# Patient Record
Sex: FEMALE | Race: WHITE | NOT HISPANIC OR LATINO
[De-identification: names, ages, dates, MRNs, and addresses within clinical notes are randomized per-mention and may not be internally consistent; named-entity substitution may affect disease eponyms.]

---

## 2023-05-12 PROBLEM — Z00.00 ENCOUNTER FOR PREVENTIVE HEALTH EXAMINATION: Status: ACTIVE | Noted: 2023-05-12

## 2023-05-16 ENCOUNTER — APPOINTMENT (OUTPATIENT)
Dept: ORTHOPEDIC SURGERY | Facility: CLINIC | Age: 73
End: 2023-05-16
Payer: MEDICARE

## 2023-05-16 DIAGNOSIS — Z87.891 PERSONAL HISTORY OF NICOTINE DEPENDENCE: ICD-10-CM

## 2023-05-16 DIAGNOSIS — Z63.5 DISRUPTION OF FAMILY BY SEPARATION AND DIVORCE: ICD-10-CM

## 2023-05-16 DIAGNOSIS — Z86.39 PERSONAL HISTORY OF OTHER ENDOCRINE, NUTRITIONAL AND METABOLIC DISEASE: ICD-10-CM

## 2023-05-16 DIAGNOSIS — Z78.9 OTHER SPECIFIED HEALTH STATUS: ICD-10-CM

## 2023-05-16 PROCEDURE — 99203 OFFICE O/P NEW LOW 30 MIN: CPT

## 2023-05-16 SDOH — SOCIAL STABILITY - SOCIAL INSECURITY: DISRUPTION OF FAMILY BY SEPARATION AND DIVORCE: Z63.5

## 2023-05-16 NOTE — HISTORY OF PRESENT ILLNESS
[Sudden] : sudden [7] : 7 [4] : 4 [Dull/Aching] : dull/aching [Throbbing] : throbbing [Intermittent] : intermittent [Household chores] : household chores [Leisure] : leisure [Social interactions] : social interactions [Rest] : rest [Retired] : Work status: retired [de-identified] : Patient is here for her right ankle. Patient states she walked into a low branch of a tree and fell on 5/11/2023. Patient went to [facility] and had an x-ray and a post-op shoe put on. Patient states she gets a dull aching pain with weight bearing. Patient states the pain is on the lateral aspect of her ankle and the arch of her foot.  [] : Post Surgical Visit: no [FreeTextEntry1] : Right ankle  [FreeTextEntry3] : 5/11/2023 [FreeTextEntry5] : Patient states she walked into a low branch of a tree and fell  [de-identified] : Movement

## 2023-05-16 NOTE — ASSESSMENT
[FreeTextEntry1] : 74 yo female presenting with right anterior talar avulsion, ATFL ankle sprain, foot sprain. Recommend Non-Surgical management\par -RLE WBAT, may ween off and d/c post-op shoe once tolerable\par -Activities as tolerated, avoid strenuous/impact related activities\par -Rest, ice, compression, elevation, NSAIDs PRN for pain. \par -All questions answered\par -F/u 6 weeks\par \par Entered by Ko Garrido PA-C acting as scribe.\par Dr. Brown Attestation\par The documentation recorded by the scribe, in my presence, accurately reflects the service I, Dr. Brown, personally performed, and the decisions made by me with my edits as appropriate.\par \par

## 2023-05-16 NOTE — PHYSICAL EXAM
[de-identified] : Examination of the right foot and ankle is as follows:\par INSPECTION: swelling, ecchymosis over anterior ankle joint line and dorsal foot, moderate swelling of dorsal foot and lateral ankle, but no abrasion, laceration, no erythema\par PALPATION: lateral ligament tenderness, tenderness to palpation over anterior ankle joint line\par ROM: plantarflexion 20 degrees, inversion 15 degrees, eversion 10 degrees, dorsiflexion 10 degrees\par STRENGTH: dorsiflexion 4/5, plantar flexion 4/5, inversion 4/5, eversion 4/5, EHL 5/5, FHL 5/5\par VASCULAR: dorsalis pedis pulse: 1+, posterior tibialis pulse: 1+\par NEURO: Sensation present to light touch in all distributions\par GAIT: mildly antalgic, RLE post-op shoe, ambulation without assisted devices\par \par X-rays of the right ankle is as follows: outside x-rays reviewed from [facility]\par Ankle 3 view: Avulsion over anterior talus otherwise there are no subluxations or dislocations. No significant abnormalities are seen.

## 2023-06-27 ENCOUNTER — APPOINTMENT (OUTPATIENT)
Dept: ORTHOPEDIC SURGERY | Facility: CLINIC | Age: 73
End: 2023-06-27
Payer: MEDICARE

## 2023-06-27 DIAGNOSIS — S93.491A SPRAIN OF OTHER LIGAMENT OF RIGHT ANKLE, INITIAL ENCOUNTER: ICD-10-CM

## 2023-06-27 DIAGNOSIS — S93.601A UNSPECIFIED SPRAIN OF RIGHT FOOT, INITIAL ENCOUNTER: ICD-10-CM

## 2023-06-27 DIAGNOSIS — S92.154A NONDISPLACED AVULSION FRACTURE (CHIP FRACTURE) OF RIGHT TALUS, INITIAL ENCOUNTER FOR CLOSED FRACTURE: ICD-10-CM

## 2023-06-27 PROCEDURE — 99213 OFFICE O/P EST LOW 20 MIN: CPT

## 2023-06-27 NOTE — PHYSICAL EXAM
[de-identified] : Examination of the right foot and ankle is as follows:\par INSPECTION: swelling, ecchymosis over anterior ankle joint line and dorsal foot, moderate swelling of dorsal foot and lateral ankle, but no abrasion, laceration, no erythema\par PALPATION: lateral ligament tenderness, tenderness to palpation over anterior ankle joint line\par ROM: plantarflexion 20 degrees, inversion 15 degrees, eversion 10 degrees, dorsiflexion 10 degrees\par STRENGTH: dorsiflexion 4/5, plantar flexion 4/5, inversion 4/5, eversion 4/5, EHL 5/5, FHL 5/5\par VASCULAR: dorsalis pedis pulse: 1+, posterior tibialis pulse: 1+\par NEURO: Sensation present to light touch in all distributions\par GAIT: mildly antalgic, RLE post-op shoe, ambulation without assisted devices\par \par X-rays of the right ankle is as follows: outside x-rays reviewed from [facility]\par Ankle 3 view: Avulsion over anterior talus otherwise there are no subluxations or dislocations. No significant abnormalities are seen.

## 2023-06-27 NOTE — HISTORY OF PRESENT ILLNESS
[Sudden] : sudden [10] : 10 [8] : 8 [Dull/Aching] : dull/aching [Sharp] : sharp [Throbbing] : throbbing [Intermittent] : intermittent [Household chores] : household chores [Leisure] : leisure [Social interactions] : social interactions [Rest] : rest [Retired] : Work status: retired [de-identified] : Patient is here for her right ankle. Patient is being treated for Sprain of anterior talofibular ligament, Sprain of right foot and \par Closed nondisplaced avulsion fracture of right talus. Patient states she still gets sharp and stabbing pain in her foot along the 5th metatarsal. Patient states she stopped wearing the post-op shoe 5/2023 on a full time basis. Patient states she still wears the post-op shoe occasionally. \par Patient reports feeling 30% better [] : Post Surgical Visit: no [FreeTextEntry1] : Right ankle  [FreeTextEntry3] : 5/11/2023 [FreeTextEntry5] : Patient states she walked into a low branch of a tree and fell  [de-identified] : Movement

## 2023-08-24 ENCOUNTER — APPOINTMENT (OUTPATIENT)
Dept: ORTHOPEDIC SURGERY | Facility: CLINIC | Age: 73
End: 2023-08-24
Payer: MEDICARE

## 2023-08-24 ENCOUNTER — TRANSCRIPTION ENCOUNTER (OUTPATIENT)
Age: 73
End: 2023-08-24

## 2023-08-24 DIAGNOSIS — S93.491D SPRAIN OF OTHER LIGAMENT OF RIGHT ANKLE, SUBSEQUENT ENCOUNTER: ICD-10-CM

## 2023-08-24 DIAGNOSIS — S92.154D NONDISPLACED AVULSION FRACTURE (CHIP FRACTURE) OF RIGHT TALUS, SUBSEQUENT ENCOUNTER FOR FRACTURE WITH ROUTINE HEALING: ICD-10-CM

## 2023-08-24 DIAGNOSIS — M76.71 PERONEAL TENDINITIS, RIGHT LEG: ICD-10-CM

## 2023-08-24 DIAGNOSIS — S93.601D UNSPECIFIED SPRAIN OF RIGHT FOOT, SUBSEQUENT ENCOUNTER: ICD-10-CM

## 2023-08-24 PROCEDURE — 99213 OFFICE O/P EST LOW 20 MIN: CPT

## 2023-08-24 NOTE — HISTORY OF PRESENT ILLNESS
[Sudden] : sudden [3] : 3 [0] : 0 [Dull/Aching] : dull/aching [Sharp] : sharp [Throbbing] : throbbing [Intermittent] : intermittent [Household chores] : household chores [Leisure] : leisure [Social interactions] : social interactions [Rest] : rest [Retired] : Work status: retired [de-identified] : Patient is here for her right ankle/foot. Patient is being treated for Sprain of anterior talofibular ligament, Sprain of right foot and  Closed nondisplaced avulsion fracture of right talus. Patient states she still gets sharp and stabbing pain in her foot along the 5th metatarsal. Patient states she has been going to PT 2x a week.  [] : Post Surgical Visit: no [FreeTextEntry1] : Right ankle  [FreeTextEntry3] : 5/11/2023 [FreeTextEntry5] : Patient states she walked into a low branch of a tree and fell  [de-identified] : Movement  [de-identified] : PT

## 2023-08-24 NOTE — PHYSICAL EXAM
[de-identified] : Examination of the right foot and ankle is as follows: INSPECTION: swelling, mild swelling of dorsal foot and lateral ankle, but no abrasion, laceration, no erythema, no ecchymosis PALPATION: mildly ttp over lateral ligaments, tenderness to palpation over peronal tendon and plantar fascia. ROM: plantarflexion 40 degrees, inversion 30 degrees, eversion 20 degrees, dorsiflexion 20 degrees STRENGTH: dorsiflexion 5/5, plantar flexion 5/5, inversion 5/5, eversion 5/5, EHL 5/5, FHL 5/5 VASCULAR: dorsalis pedis pulse: 1+, posterior tibialis pulse: 1+ NEURO: Sensation present to light touch in all distributions GAIT: non-antalgic, patient ambulates without assisted devices.